# Patient Record
Sex: FEMALE | Race: WHITE | URBAN - METROPOLITAN AREA
[De-identification: names, ages, dates, MRNs, and addresses within clinical notes are randomized per-mention and may not be internally consistent; named-entity substitution may affect disease eponyms.]

---

## 2020-05-05 ENCOUNTER — HOSPITAL ENCOUNTER (OUTPATIENT)
Age: 45
Discharge: HOME | End: 2020-05-07

## 2020-05-05 PROCEDURE — 88305 TISSUE EXAM BY PATHOLOGIST: CPT

## 2021-01-21 DIAGNOSIS — N60.02 BREAST CYST, LEFT: Primary | ICD-10-CM

## 2021-01-21 NOTE — PROGRESS NOTES
Subjective:      Patient ID: Delores Curtis is a 51 y.o. female. HPI  History and Physical    Patient's Name/Date of Birth: Delores Curtis / 1975    Date: 2021        Delores Curtis presents for evaluation of bilateral breast cysts. PCP: Liliane Renae DO. Gynecologist: Manju Mandujano. The breast cyst is located in the 9 o'clock position of the Right breast and two benign 2.5 cm cyst left breast. The Left breast cyst was discovered by exam during her cycle. The patient has not noted a change in BSE since presentation. Patient denies nipple discharge. Patient denies a personal history of breast cancer. Breast cancer risk factors include gender paternal grandmother with breast cancer in her 63's;  in her 63's. Maternal grandmother with pancreatic cancer in her [de-identified]. Ashkenazi Ep[employer]opalian Ancestry: No.    OBSTETRIC RELATED HISTORY:  Age of menarche was 14. Age of menopause was had ablation, periods are sparse. Patient denies hormonal therapy. Patient is . Age of first live birth was 31. Patient did breast feed. Is patient interested in fertility information about fertility preservation? No    CANCER SURVEILLANCE HISTORY:  Mammograms: Yes   Breast MRI's: No   Breast Biopsies: No   Colonoscopy: Not Applicable   GI Polyps: Not Applicable   EGD: Not Applicable   Pelvic Exam: Yes   Pap Smear: Yes   Dermatology: Yes   Lung screening: no        There is no height or weight on file to calculate BMI. Bra Size: 36b    Because violence is so common, we ask all our patients: are you in a relationship or do you live with a person who threatens, hurts, or controls you:  denies    Patient drinks little caffeinated beverages. Patient does not smoke cigarettes. Patient does not use recreational drugs. Past Medical History:   Diagnosis Date    Anxiety        No past surgical history on file.     Current Outpatient Medications   Medication Sig Dispense Refill    citalopram (CELEXA) 40 MG tablet Take 40 mg by mouth daily.  betamethasone dipropionate (DIPROLENE) 0.05 % ointment Apply topically 2 times daily. 1 Tube 0     No current facility-administered medications for this visit. No Known Allergies          Social History     Socioeconomic History    Marital status:      Spouse name: Not on file    Number of children: Not on file    Years of education: Not on file    Highest education level: Not on file   Occupational History    Not on file   Social Needs    Financial resource strain: Not on file    Food insecurity     Worry: Not on file     Inability: Not on file    Transportation needs     Medical: Not on file     Non-medical: Not on file   Tobacco Use    Smoking status: Never Smoker   Substance and Sexual Activity    Alcohol use: No    Drug use: Not on file    Sexual activity: Not on file   Lifestyle    Physical activity     Days per week: Not on file     Minutes per session: Not on file    Stress: Not on file   Relationships    Social connections     Talks on phone: Not on file     Gets together: Not on file     Attends Confucianism service: Not on file     Active member of club or organization: Not on file     Attends meetings of clubs or organizations: Not on file     Relationship status: Not on file    Intimate partner violence     Fear of current or ex partner: Not on file     Emotionally abused: Not on file     Physically abused: Not on file     Forced sexual activity: Not on file   Other Topics Concern    Not on file   Social History Narrative    Not on file       Occupation: Stay at home mom. Review of Systems   Constitutional: Negative for activity change, appetite change, chills, fatigue, fever and unexpected weight change. Generally feels well. Busy as stay at home mom and with a new [employer].      HENT: Negative for congestion, postnasal drip, rhinorrhea, sinus pressure, sinus pain, sore throat, trouble swallowing and distress or retractions. Breath sounds: Normal breath sounds. No stridor. No wheezing or rales. Chest:      Chest wall: No mass, lacerations, deformity, swelling, tenderness or edema. Breasts: Breasts are symmetrical.         Right: No inverted nipple, mass, nipple discharge, skin change or tenderness. Left: No inverted nipple, mass, nipple discharge, skin change or tenderness. Comments: Breasts are dense bilaterally. No skin changes. No nipple discharge. She has multiple, varying in size, freely mobile, benign appearing breast masses bilaterally. There are no clinically suspicious lumps nodules or masses appreciated. No axillary lymphadenopathy. Abdominal:      General: There is no distension. Palpations: Abdomen is soft. Tenderness: There is no abdominal tenderness. There is no guarding or rebound. Musculoskeletal: Normal range of motion. General: No tenderness or deformity. Right shoulder: Normal.      Left shoulder: Normal.   Lymphadenopathy:      Cervical: No cervical adenopathy. Right cervical: No superficial, deep or posterior cervical adenopathy. Left cervical: No superficial, deep or posterior cervical adenopathy. Upper Body:      Right upper body: No pectoral adenopathy. Left upper body: No pectoral adenopathy. Skin:     General: Skin is warm and dry. Coloration: Skin is not pale. Findings: No erythema or rash. Neurological:      Mental Status: She is alert and oriented to person, place, and time. Coordination: Coordination normal.   Psychiatric:         Behavior: Behavior normal.         Thought Content: Thought content normal.         Judgment: Judgment normal.        Assessment:      51 y.o. extremely pleasant female without unusual risks for breast cancer. She presents for evaluation of a left breast mass which became more bothersome during her menstrual cycle.     1/7/2021: DIAGNOSTIC MAMMOGRAM AND RIGHT BREAST ULTRASOUND; [facility]              2/1/2021: LEFT BREAST ULTRASOUND,  ASPIRATION LEFT BREAST CYST [facility]      ASPIRATION/DRAINAGE:   Left subareolar cyst       PROCEDURE:   Following discussion of alternatives, risks, and benefits; an informed written consent was obtained. A pre-procedural \"time-out\" was performed.       The left breast was prepped and draped in the usual sterile fashion. Local anesthesia was acheived with buffered 1% lidocaine. A 18 g hypodermic needle was utilized under real-time sonographic guidance to collect 5 ml of yellowish fluid in the subareolar location.  The cyst shows near total resolution.       A surgical marker clip was not placed.       The patient tolerated the procedure without complications, verbalized understanding of care instructions given, and was discharged to home in satisfactory condition.       IMPRESSION:   Status post satisfactory ultrasound-guided cyst aspiration/drainage of left breast.               She has very dense breast tissue bilaterally. The left breast cyst clinically responded nicely to aspiration and there were no suspicious findings identified on this exam.  A Tyrer-Citronelle risk evaluation was personally preformed and she has a 16.5 % lifetime risk (to age 79) of developing breast cancer. At her current age, her 8 year risk of breast cancer is 4.1%  Imaging reviewed today. We reviewed the that simple cysts do not increase a woman's risk for developing breast cancer, and that her breast should quiet down once she is post menopausal.  We reviewed the importance of breast self-exam in detail as well as the importance of wearing a good supportive bra. Plan:      1. Continue monthly breast self examination; detailed instructions reviewed today. Bring any changes to your physician's attention. 2. Avoid alcohol. 3. Limit caffeine intake, healthy diet and exercise (she does a great job of this at baseline). .  4. Routine screening imaging in January 2022 (not ordered). .  5. Continue follow up with Primary Care and Gynecology. 6. Office follow-up visit should be scheduled as needed for new or worsening symptoms. .      During today's visit, face-to-face time 30 minutes, greater than 50% in counseling education and coordination of care. All questions were answered to her apparent satisfaction, and she is agreeable to the plan as outlined above. This document is generated, in part, by voice recognition software and thus syntax and grammatical errors are possible. Aryan Arshad, RN, MSN, APRN-CNP, [address] [place]  Advanced Oncology Certified Nurse Practitioner  Department of Breast Surgery  [facility]/  [facility] in collaboration with Dr. Diane Nuñez.  [place]/Dr. Clementina Martinez APRN-CNP

## 2021-01-29 ENCOUNTER — TELEPHONE (OUTPATIENT)
Dept: BREAST CENTER | Age: 46
End: 2021-01-29

## 2021-01-29 NOTE — TELEPHONE ENCOUNTER
Left message with call back number in attempt to obtain updated obstetric related and cancer surveillance history. This information will be used for medical decision making and planning for the upcoming consultation in the [facility]) breast clinic on February 1, 2021 with GEENA Tamayo CNP.

## 2021-02-01 ENCOUNTER — HOSPITAL ENCOUNTER (OUTPATIENT)
Dept: GENERAL RADIOLOGY | Age: 46
End: 2021-02-01
Payer: COMMERCIAL

## 2021-02-01 ENCOUNTER — OFFICE VISIT (OUTPATIENT)
Dept: BREAST CENTER | Age: 46
End: 2021-02-01
Payer: COMMERCIAL

## 2021-02-01 ENCOUNTER — HOSPITAL ENCOUNTER (OUTPATIENT)
Dept: GENERAL RADIOLOGY | Age: 46
Discharge: HOME | End: 2021-02-03
Payer: COMMERCIAL

## 2021-02-01 VITALS
OXYGEN SATURATION: 98 % | WEIGHT: 140 LBS | RESPIRATION RATE: 18 BRPM | HEIGHT: 65 IN | BODY MASS INDEX: 23.32 KG/M2 | DIASTOLIC BLOOD PRESSURE: 60 MMHG | HEART RATE: 65 BPM | SYSTOLIC BLOOD PRESSURE: 102 MMHG

## 2021-02-01 DIAGNOSIS — N60.02 BREAST CYST, LEFT: ICD-10-CM

## 2021-02-01 PROCEDURE — 99213 OFFICE O/P EST LOW 20 MIN: CPT | Performed by: NURSE PRACTITIONER

## 2021-02-01 PROCEDURE — 76942 ECHO GUIDE FOR BIOPSY: CPT

## 2021-02-01 PROCEDURE — 2500000003 HC RX 250 WO HCPCS

## 2021-02-01 PROCEDURE — 99203 OFFICE O/P NEW LOW 30 MIN: CPT | Performed by: NURSE PRACTITIONER

## 2021-02-01 ASSESSMENT — ENCOUNTER SYMPTOMS
WHEEZING: 0
CHEST TIGHTNESS: 0
CHOKING: 0
TROUBLE SWALLOWING: 0
CONSTIPATION: 0
BLOOD IN STOOL: 0
RHINORRHEA: 0
ABDOMINAL PAIN: 0
COUGH: 0
BACK PAIN: 0
SHORTNESS OF BREATH: 0
SINUS PAIN: 0
DIARRHEA: 0
ABDOMINAL DISTENTION: 0
EYE DISCHARGE: 0
SINUS PRESSURE: 0
SORE THROAT: 0
VOICE CHANGE: 0
NAUSEA: 0
VOMITING: 0
EYE ITCHING: 0

## 2021-02-01 NOTE — LETTER
[facility]  [address] [address]  Phone: [phone]  Fax: [phone]      February 1, 2021       Patient: Bere Lilly   MR Number: <[id number]>   YOB: 1975   Date of Visit: 2/1/2021       Dear Dr Ludivina Gasca: Thank you for the request for consultation for Bere Lilly to the office of Dr. Annamarie Naidu and nurse practitioner Jelly Gong. Below are the relevant portions of my assessment and plan of care. 51 y.o. extremely pleasant female without unusual risks for breast cancer. She presents for evaluation of a left breast mass which became more bothersome during her menstrual cycle. 1/7/2021: DIAGNOSTIC MAMMOGRAM AND RIGHT BREAST ULTRASOUND; [facility]              2/1/2021: LEFT BREAST ULTRASOUND,  ASPIRATION LEFT BREAST CYST [facility]      ASPIRATION/DRAINAGE:   Left subareolar cyst       PROCEDURE:   Following discussion of alternatives, risks, and benefits; an informed written consent was obtained. A pre-procedural \"time-out\" was performed.       The left breast was prepped and draped in the usual sterile fashion. Local anesthesia was acheived with buffered 1% lidocaine.    A 18 g hypodermic needle was utilized under real-time sonographic guidance to collect 5 ml of yellowish fluid in the subareolar location.  The cyst shows near total resolution.       A surgical marker clip was not placed.       The patient tolerated the procedure without complications, verbalized understanding of care instructions given, and was discharged to home in satisfactory condition.       IMPRESSION:   Status post satisfactory ultrasound-guided cyst aspiration/drainage of left breast.         She has very dense breast tissue bilaterally. The left breast cyst clinically responded nicely to aspiration and there were no suspicious findings identified on this exam.  A Tyrer-Yoli risk evaluation was personally preformed and she has a 16.5 % lifetime risk (to age 79) of developing breast cancer. At her current age, her 8 year risk of breast cancer is 4.1%  Imaging reviewed today. We reviewed the that simple cysts do not increase a woman's risk for developing breast cancer, and that her breast should quiet down once she is post menopausal.  We reviewed the importance of breast self-exam in detail as well as the importance of wearing a good supportive bra. Plan:      1. Continue monthly breast self examination; detailed instructions reviewed today. Bring any changes to your physician's attention. 2. Avoid alcohol. 3. Limit caffeine intake, healthy diet and exercise (she does a great job of this at baseline). .  4. Routine screening imaging in January 2022 (not ordered). .  5. Continue follow up with Primary Care and Gynecology. 6. Office follow-up visit should be scheduled as needed for new or worsening symptoms. .        If you have questions, we can be reached by calling the office at [phone]. We appreciated the opportunity to participate in the care of this extremely pleasant lady. Sincerely,    Humberto Kimbrough RN, MSN, APRN-CNP, [address] [place]  Advanced Oncology Certified Nurse Practitioner  Department of Breast Surgery  [facility] [place]/  [facility] in collaboration with Dr. Mikaela Nichols/Dr. Mark Sanchez APRN-CNP      Meghan Chen, APRN - CNP    CC providers:  Lexy Bonilla DO

## 2022-11-17 ENCOUNTER — HOSPITAL ENCOUNTER (EMERGENCY)
Age: 47
Discharge: HOME | End: 2022-11-17
Payer: COMMERCIAL

## 2022-11-17 VITALS
BODY MASS INDEX: 25.1 KG/M2 | DIASTOLIC BLOOD PRESSURE: 67 MMHG | HEART RATE: 64 BPM | RESPIRATION RATE: 16 BRPM | TEMPERATURE: 98.1 F | OXYGEN SATURATION: 100 % | SYSTOLIC BLOOD PRESSURE: 95 MMHG | HEIGHT: 64 IN | WEIGHT: 147 LBS

## 2022-11-17 DIAGNOSIS — J32.9 SINOBRONCHITIS: Primary | ICD-10-CM

## 2022-11-17 DIAGNOSIS — J40 SINOBRONCHITIS: Primary | ICD-10-CM

## 2022-11-17 PROCEDURE — 99211 OFF/OP EST MAY X REQ PHY/QHP: CPT

## 2022-11-17 RX ORDER — DOXYCYCLINE HYCLATE 100 MG
100 TABLET ORAL 2 TIMES DAILY
Qty: 14 TABLET | Refills: 0 | Status: SHIPPED | OUTPATIENT
Start: 2022-11-17 | End: 2022-11-24

## 2022-11-17 ASSESSMENT — PAIN SCALES - GENERAL: PAINLEVEL_OUTOF10: 3

## 2022-11-17 ASSESSMENT — PAIN - FUNCTIONAL ASSESSMENT: PAIN_FUNCTIONAL_ASSESSMENT: 0-10

## 2022-11-17 ASSESSMENT — PAIN DESCRIPTION - LOCATION: LOCATION: HEAD

## 2022-11-17 NOTE — ED PROVIDER NOTES
[address] [facility]  Department of Emergency Medicine  UC Encounter Note  22   4:25 PM EST      NAME: Rishabh Epperson  :  1975  MRN:  [id number]    Chief Complaint: Cough and Headache (Cough and headache x 2 weeks )      This is a 79-year-old female the presents to urgent care complaining of sinus symptoms and URI symptoms initially started about a week and 1/2 to 2 weeks ago and now she thinks that she is having more chest congestion. She denies any shortness of breath. She denies abdominal pain nausea vomiting diarrhea or urinary symptoms. On first contact patient she appears to be in no acute distress. Review of Systems  Pertinent positives and negatives are stated within HPI, all other systems reviewed and are negative. Physical Exam  Vitals and nursing note reviewed. Constitutional:       Appearance: She is well-developed. HENT:      Head: Normocephalic and atraumatic. Jaw: There is normal jaw occlusion. Right Ear: Hearing and external ear normal. Tympanic membrane is bulging. Left Ear: Hearing and external ear normal. Tympanic membrane is bulging. Nose: Congestion and rhinorrhea present. Right Sinus: Frontal sinus tenderness present. Left Sinus: Frontal sinus tenderness present. Mouth/Throat:      Pharynx: Uvula midline. Eyes:      General: Lids are normal.      Conjunctiva/sclera: Conjunctivae normal.      Pupils: Pupils are equal, round, and reactive to light. Cardiovascular:      Rate and Rhythm: Normal rate and regular rhythm. Heart sounds: Normal heart sounds. No murmur heard. Pulmonary:      Effort: Pulmonary effort is normal.      Breath sounds: Normal breath sounds. Abdominal:      General: Bowel sounds are normal.      Palpations: Abdomen is soft. Abdomen is not rigid. Tenderness: There is no abdominal tenderness. There is no guarding or rebound.    Musculoskeletal:      Cervical back: Normal range of motion and neck supple. Skin:     General: Skin is warm and dry. Findings: No abrasion or rash. Neurological:      General: No focal deficit present. Mental Status: She is alert and oriented to person, place, and time. GCS: GCS eye subscore is 4. GCS verbal subscore is 5. GCS motor subscore is 6. Cranial Nerves: No cranial nerve deficit. Sensory: No sensory deficit. Coordination: Coordination normal.      Gait: Gait normal.       Procedures    MDM  Number of Diagnoses or Management Options  Sinobronchitis  Diagnosis management comments: Patient no acute distress. Treat for sinobronchitis. Medications were discussed instructions given.             --------------------------------------------- PAST HISTORY ---------------------------------------------  Past Medical History:  has a past medical history of Anxiety. Past Surgical History:  has a past surgical history that includes US ASP BREAST CYST LEFT (Left, 2/1/2021). Social History:  reports that she has never smoked. She has never used smokeless tobacco. She reports that she does not drink alcohol and does not use drugs. Family History: family history includes Breast Cancer (age of onset: 61) in her paternal grandmother; Pancreatic Cancer (age of onset: [de-identified]) in her maternal grandmother. The patients home medications have been reviewed. Allergies: Patient has no known allergies. -------------------------------------------------- RESULTS -------------------------------------------------  No results found for this visit on 11/17/22. No orders to display       ------------------------- NURSING NOTES AND VITALS REVIEWED ---------------------------   The nursing notes within the ED encounter and vital signs as below have been reviewed.    BP 95/67   Pulse 64   Temp 98.1 °F (36.7 °C) (Infrared)   Resp 16   Ht 5' 4\" (1.626 m)   Wt 147 lb (66.7 kg)   LMP 11/03/2022   SpO2 100%   BMI 25.23 kg/m²   Oxygen Saturation Interpretation: Normal      ------------------------------------------ PROGRESS NOTES ------------------------------------------   I have spoken with the patient and discussed todays results, in addition to providing specific details for the plan of care and counseling regarding the diagnosis and prognosis. Their questions are answered at this time and they are agreeable with the plan.      --------------------------------- ADDITIONAL PROVIDER NOTES ---------------------------------     This patient is stable for discharge. I have shared the specific conditions for return, as well as the importance of follow-up. * NOTE: This report was transcribed using voice recognition software. Every effort was made to ensure accuracy; however, inadvertent computerized transcription errors may be present.    --------------------------------- IMPRESSION AND DISPOSITION ---------------------------------    IMPRESSION  1.  Sinobronchitis        DISPOSITION  Disposition: Discharge to home  Patient condition is good       Karmen Stiles PA-C  11/17/22 [id number]

## 2024-05-23 LAB
CHOLEST SERPL-MCNC: 178 MG/DL
HDLC SERPL-MCNC: 67 MG/DL
LDLC SERPL CALC-MCNC: 103 MG/DL
TRIGL SERPL-MCNC: 39 MG/DL
VLDLC SERPL CALC-MCNC: 8 MG/DL